# Patient Record
Sex: FEMALE | Race: WHITE
[De-identification: names, ages, dates, MRNs, and addresses within clinical notes are randomized per-mention and may not be internally consistent; named-entity substitution may affect disease eponyms.]

---

## 2018-10-23 ENCOUNTER — HOSPITAL ENCOUNTER (EMERGENCY)
Dept: HOSPITAL 96 - M.ERS | Age: 43
Discharge: HOME | End: 2018-10-23
Payer: COMMERCIAL

## 2018-10-23 VITALS — BODY MASS INDEX: 38.57 KG/M2 | WEIGHT: 240 LBS | HEIGHT: 66 IN

## 2018-10-23 VITALS — SYSTOLIC BLOOD PRESSURE: 125 MMHG | DIASTOLIC BLOOD PRESSURE: 74 MMHG

## 2018-10-23 DIAGNOSIS — J45.909: ICD-10-CM

## 2018-10-23 DIAGNOSIS — E11.9: ICD-10-CM

## 2018-10-23 DIAGNOSIS — G43.909: ICD-10-CM

## 2018-10-23 DIAGNOSIS — R07.9: Primary | ICD-10-CM

## 2018-10-23 LAB
ABSOLUTE BASOPHILS: 0 THOU/UL (ref 0–0.2)
ABSOLUTE EOSINOPHILS: 0.1 THOU/UL (ref 0–0.7)
ABSOLUTE MONOCYTES: 0.5 THOU/UL (ref 0–1.2)
ALBUMIN SERPL-MCNC: 4 G/DL (ref 3.4–5)
ALP SERPL-CCNC: 80 U/L (ref 46–116)
ALT SERPL-CCNC: 47 U/L (ref 30–65)
ANION GAP SERPL CALC-SCNC: 10 MMOL/L (ref 7–16)
APTT BLD: 27.5 SECONDS (ref 25–31.3)
AST SERPL-CCNC: 14 U/L (ref 15–37)
BASOPHILS NFR BLD AUTO: 0.4 %
BILIRUB SERPL-MCNC: 0.7 MG/DL
BUN SERPL-MCNC: 14 MG/DL (ref 7–18)
CALCIUM SERPL-MCNC: 9.2 MG/DL (ref 8.5–10.1)
CHLORIDE SERPL-SCNC: 103 MMOL/L (ref 98–107)
CK-MB MASS: 0.5 NG/ML
CO2 SERPL-SCNC: 25 MMOL/L (ref 21–32)
CREAT SERPL-MCNC: 1 MG/DL (ref 0.6–1.3)
EOSINOPHIL NFR BLD: 1.4 %
GLUCOSE SERPL-MCNC: 164 MG/DL (ref 70–99)
GRANULOCYTES NFR BLD MANUAL: 63.4 %
HCT VFR BLD CALC: 50.7 % (ref 37–47)
HGB BLD-MCNC: 17.2 GM/DL (ref 12–15)
INR PPP: 1
LIPASE: 291 U/L (ref 73–393)
LYMPHOCYTES # BLD: 2 THOU/UL (ref 0.8–5.3)
LYMPHOCYTES NFR BLD AUTO: 28.1 %
MAGNESIUM SERPL-MCNC: 1.9 MG/DL (ref 1.8–2.4)
MCH RBC QN AUTO: 31.5 PG (ref 26–34)
MCHC RBC AUTO-ENTMCNC: 34 G/DL (ref 28–37)
MCV RBC: 92.5 FL (ref 80–100)
MONOCYTES NFR BLD: 6.7 %
MPV: 9.2 FL. (ref 7.2–11.1)
NEUTROPHILS # BLD: 4.6 THOU/UL (ref 1.6–8.1)
NT-PRO BRAIN NAT PEPTIDE: 8 PG/ML (ref ?–300)
NUCLEATED RBCS: 0 /100WBC
PLATELET COUNT*: 196 THOU/UL (ref 150–400)
POTASSIUM SERPL-SCNC: 4.1 MMOL/L (ref 3.5–5.1)
PROT SERPL-MCNC: 7.6 G/DL (ref 6.4–8.2)
PROTHROMBIN TIME: 10.2 SECONDS (ref 9.2–11.5)
RBC # BLD AUTO: 5.48 MIL/UL (ref 4.2–5)
RDW-CV: 13.1 % (ref 10.5–14.5)
SODIUM SERPL-SCNC: 138 MMOL/L (ref 136–145)
TROPONIN-I LEVEL: <0.06 NG/ML (ref ?–0.06)
WBC # BLD AUTO: 7.3 THOU/UL (ref 4–11)

## 2018-10-24 NOTE — EKG
[place] [address]
Phone:  [phone]                     ELECTROCARDIOGRAM REPORT      
_______________________________________________________________________________
 
Name:       VINCE GARCIA                 Room:                      [facility]#:  [id number]      Account #:      [id number]  
Admission:  10/23/18     Attend Phys:                         
Discharge:  10/23/18     Date of Birth:  [birth year]  
         Report #: [id number]
    [id number]
_______________________________________________________________________________
THIS REPORT FOR:  //name//                      
 
                         [facility] ED
                                       
Test Date:    2018-10-23               Test Time:    11:48:49
Pat Name:     VINCE GARCIA             Department:   
Patient ID:   [id number]            Room:          
Gender:       F                        Technician:   ma
:          1975               Requested By: Micheal Douglas
Order Number: [id number] MD:   Radames Mohr
                                 Measurements
Intervals                              Axis          
Rate:         97                       P:            53
NJ:           145                      QRS:          24
QRSD:         79                       T:            36
QT:           332                                    
QTc:          422                                    
                           Interpretive Statements
Sinus rhythm
Baseline wander in lead(s) I,II,aVR,aVF,V2,V3,V4
Compared to ECG 2017 22:51:26
no change
 
Electronically Signed On 10- 10:50:32 CDT by Radames Mohr
[email or web address]
 
 
 
 
 
 
 
 
 
 
 
 
 
 
 
 
 
 
  <ELECTRONICALLY SIGNED>
                                           By: Radames Mohr MD, [facility]      
  10/24/18     1050
D: 10/23/18 1148   _____________________________________
T: 10/23/18 1148   [facility]ames Mohr MD, [facility]        /EPI